# Patient Record
(demographics unavailable — no encounter records)

---

## 2024-11-26 NOTE — PHYSICAL EXAM
[Normal Appearance] : normal appearance [] : no respiratory distress [Normal Station and Gait] : the gait and station were normal for the patient's age [Oriented To Time, Place, And Person] : oriented to person, place, and time [Chaperone Present] : A chaperone was present in the examining room during all aspects of the physical examination [de-identified] : Phallus bruised and rupture blisters noted  [FreeTextEntry2] : Dr Wanda Li  [FreeTextEntry3] : Adam Alonso NP performed

## 2024-11-26 NOTE — HISTORY OF PRESENT ILLNESS
[FreeTextEntry1] : Patient returns to the office today for follow up peyronies disease. He underwent in office modeling. He reports some blisters and swelling to sight that has improved.

## 2024-11-26 NOTE — PHYSICAL EXAM
[Normal Appearance] : normal appearance [] : no respiratory distress [Normal Station and Gait] : the gait and station were normal for the patient's age [Oriented To Time, Place, And Person] : oriented to person, place, and time [Chaperone Present] : A chaperone was present in the examining room during all aspects of the physical examination [de-identified] : Phallus bruised and rupture blisters noted  [FreeTextEntry2] : Dr Wanda Li  [FreeTextEntry3] : Adam Alonso NP performed

## 2025-01-07 NOTE — PHYSICAL EXAM
[Normal Appearance] : normal appearance [] : no respiratory distress [Normal Station and Gait] : the gait and station were normal for the patient's age [Oriented To Time, Place, And Person] : oriented to person, place, and time [Chaperone Present] : A chaperone was present in the examining room during all aspects of the physical examination [de-identified] : tiny opening to distal phallus  [FreeTextEntry2] : Dr Torres  [FreeTextEntry3] : Yg Alonso NP Exam

## 2025-01-07 NOTE — PHYSICAL EXAM
[Normal Appearance] : normal appearance [] : no respiratory distress [Normal Station and Gait] : the gait and station were normal for the patient's age [Oriented To Time, Place, And Person] : oriented to person, place, and time [Chaperone Present] : A chaperone was present in the examining room during all aspects of the physical examination [de-identified] : tiny opening to distal phallus  [FreeTextEntry2] : Dr Torres  [FreeTextEntry3] : Yg Alonso NP Exam

## 2025-01-07 NOTE — HISTORY OF PRESENT ILLNESS
[FreeTextEntry1] : 61 year old male presents to the office for follow up Peyronies. He is here s/p first cycle of Xiaflex and reports that he noticed a small improvement in curvature and would like to try a second cycle. He also reports noticing a small blister on his phallus which has reolved.

## 2025-02-18 NOTE — HISTORY OF PRESENT ILLNESS
[FreeTextEntry1] : Patient returns to the office today for follow up peyronies disease. He underwent in office modeling. He reports that after the second injection he had some discomfort and swelling to his phallus which has improved since then.

## 2025-02-18 NOTE — PHYSICAL EXAM
[Normal Appearance] : normal appearance [] : no respiratory distress [Normal Station and Gait] : the gait and station were normal for the patient's age [Oriented To Time, Place, And Person] : oriented to person, place, and time [Chaperone Present] : A chaperone was present in the examining room during all aspects of the physical examination [FreeTextEntry2] : Elijah Nath

## 2025-05-27 NOTE — ASSESSMENT
[FreeTextEntry1] : Continue Finasteride and increase Tamsulosin 0.4mg x2 daily. Goals of medication reviewed.  Discussed the potential adverse effects of the medication.  Discussed the proper administration of the medication.  PVR - reviewed.   If does not improve with increased medication, will need to consider outlet surgery such as TURP.  Follow up in three months.

## 2025-05-27 NOTE — HISTORY OF PRESENT ILLNESS
[FreeTextEntry1] : Here for annual follow up.  Remains on finasteride and tamsulosin 0.4 mg.  Urinary function: Weak stream, nocturia 1-2x and hesitancy at nights. Feels like he is not emptying.  Only complaint is retrograde ejaculation.  Poor erections. Consulted Dr Riccardo Torres, trialed Trimix without improvement.  No hematuria, dysuria.  12/21/2024 PSA - 1.21 ng/mL  PVR - 267 mL